# Patient Record
Sex: MALE | Race: BLACK OR AFRICAN AMERICAN | URBAN - METROPOLITAN AREA
[De-identification: names, ages, dates, MRNs, and addresses within clinical notes are randomized per-mention and may not be internally consistent; named-entity substitution may affect disease eponyms.]

---

## 2022-03-05 ENCOUNTER — HOSPITAL ENCOUNTER (EMERGENCY)
Age: 22
Discharge: PSYCHIATRIC OR SUBSTANCE USE FACILITY | End: 2022-03-06
Attending: EMERGENCY MEDICINE
Payer: COMMERCIAL

## 2022-03-05 DIAGNOSIS — R45.851 SUICIDAL IDEATION: Primary | ICD-10-CM

## 2022-03-05 LAB
ACETAMINOPHEN LEVEL: <5 MCG/ML (ref 10–30)
ALBUMIN SERPL-MCNC: 4.4 G/DL (ref 3.5–5.2)
ALP BLD-CCNC: 95 U/L (ref 40–129)
ALT SERPL-CCNC: 18 U/L (ref 0–40)
ANION GAP SERPL CALCULATED.3IONS-SCNC: 10 MMOL/L (ref 7–16)
AST SERPL-CCNC: 26 U/L (ref 0–39)
BASOPHILS ABSOLUTE: 0.03 E9/L (ref 0–0.2)
BASOPHILS RELATIVE PERCENT: 0.7 % (ref 0–2)
BILIRUB SERPL-MCNC: <0.2 MG/DL (ref 0–1.2)
BUN BLDV-MCNC: 13 MG/DL (ref 6–20)
CALCIUM SERPL-MCNC: 9.1 MG/DL (ref 8.6–10.2)
CHLORIDE BLD-SCNC: 102 MMOL/L (ref 98–107)
CO2: 27 MMOL/L (ref 22–29)
CREAT SERPL-MCNC: 1.1 MG/DL (ref 0.7–1.2)
EOSINOPHILS ABSOLUTE: 0.38 E9/L (ref 0.05–0.5)
EOSINOPHILS RELATIVE PERCENT: 9.1 % (ref 0–6)
ETHANOL: <10 MG/DL (ref 0–0.08)
GFR AFRICAN AMERICAN: >60
GFR NON-AFRICAN AMERICAN: >60 ML/MIN/1.73
GLUCOSE BLD-MCNC: 94 MG/DL (ref 74–99)
HCT VFR BLD CALC: 40.9 % (ref 37–54)
HEMOGLOBIN: 12.4 G/DL (ref 12.5–16.5)
IMMATURE GRANULOCYTES #: 0.01 E9/L
IMMATURE GRANULOCYTES %: 0.2 % (ref 0–5)
INFLUENZA A: NOT DETECTED
INFLUENZA B: NOT DETECTED
LYMPHOCYTES ABSOLUTE: 1.46 E9/L (ref 1.5–4)
LYMPHOCYTES RELATIVE PERCENT: 35.1 % (ref 20–42)
MCH RBC QN AUTO: 25.1 PG (ref 26–35)
MCHC RBC AUTO-ENTMCNC: 30.3 % (ref 32–34.5)
MCV RBC AUTO: 82.8 FL (ref 80–99.9)
MONOCYTES ABSOLUTE: 0.37 E9/L (ref 0.1–0.95)
MONOCYTES RELATIVE PERCENT: 8.9 % (ref 2–12)
NEUTROPHILS ABSOLUTE: 1.91 E9/L (ref 1.8–7.3)
NEUTROPHILS RELATIVE PERCENT: 46 % (ref 43–80)
PDW BLD-RTO: 13.2 FL (ref 11.5–15)
PLATELET # BLD: 122 E9/L (ref 130–450)
PMV BLD AUTO: ABNORMAL FL (ref 7–12)
POTASSIUM REFLEX MAGNESIUM: 4.3 MMOL/L (ref 3.5–5)
RBC # BLD: 4.94 E12/L (ref 3.8–5.8)
SALICYLATE, SERUM: <0.3 MG/DL (ref 0–30)
SARS-COV-2 RNA, RT PCR: NOT DETECTED
SODIUM BLD-SCNC: 139 MMOL/L (ref 132–146)
TOTAL PROTEIN: 7.3 G/DL (ref 6.4–8.3)
TRICYCLIC ANTIDEPRESSANTS SCREEN SERUM: NEGATIVE NG/ML
TROPONIN, HIGH SENSITIVITY: <6 NG/L (ref 0–11)
WBC # BLD: 4.2 E9/L (ref 4.5–11.5)

## 2022-03-05 PROCEDURE — 84484 ASSAY OF TROPONIN QUANT: CPT

## 2022-03-05 PROCEDURE — 36415 COLL VENOUS BLD VENIPUNCTURE: CPT

## 2022-03-05 PROCEDURE — 82077 ASSAY SPEC XCP UR&BREATH IA: CPT

## 2022-03-05 PROCEDURE — 99285 EMERGENCY DEPT VISIT HI MDM: CPT

## 2022-03-05 PROCEDURE — 87636 SARSCOV2 & INF A&B AMP PRB: CPT

## 2022-03-05 PROCEDURE — 85025 COMPLETE CBC W/AUTO DIFF WBC: CPT

## 2022-03-05 PROCEDURE — 80307 DRUG TEST PRSMV CHEM ANLYZR: CPT

## 2022-03-05 PROCEDURE — 93005 ELECTROCARDIOGRAM TRACING: CPT | Performed by: EMERGENCY MEDICINE

## 2022-03-05 PROCEDURE — 80143 DRUG ASSAY ACETAMINOPHEN: CPT

## 2022-03-05 PROCEDURE — 80053 COMPREHEN METABOLIC PANEL: CPT

## 2022-03-05 PROCEDURE — 80179 DRUG ASSAY SALICYLATE: CPT

## 2022-03-06 VITALS
TEMPERATURE: 97.7 F | RESPIRATION RATE: 16 BRPM | OXYGEN SATURATION: 100 % | SYSTOLIC BLOOD PRESSURE: 131 MMHG | DIASTOLIC BLOOD PRESSURE: 70 MMHG | HEART RATE: 75 BPM

## 2022-03-06 LAB
AMPHETAMINE SCREEN, URINE: NOT DETECTED
BARBITURATE SCREEN URINE: NOT DETECTED
BENZODIAZEPINE SCREEN, URINE: NOT DETECTED
CANNABINOID SCREEN URINE: NOT DETECTED
COCAINE METABOLITE SCREEN URINE: NOT DETECTED
EKG ATRIAL RATE: 68 BPM
EKG P AXIS: 51 DEGREES
EKG P-R INTERVAL: 158 MS
EKG Q-T INTERVAL: 370 MS
EKG QRS DURATION: 92 MS
EKG QTC CALCULATION (BAZETT): 393 MS
EKG R AXIS: 69 DEGREES
EKG T AXIS: 44 DEGREES
EKG VENTRICULAR RATE: 68 BPM
FENTANYL SCREEN, URINE: NOT DETECTED
Lab: NORMAL
METHADONE SCREEN, URINE: NOT DETECTED
OPIATE SCREEN URINE: NOT DETECTED
OXYCODONE URINE: NOT DETECTED
PHENCYCLIDINE SCREEN URINE: NOT DETECTED

## 2022-03-06 PROCEDURE — 93010 ELECTROCARDIOGRAM REPORT: CPT | Performed by: INTERNAL MEDICINE

## 2022-03-06 NOTE — ED NOTES
Emergency Department [facility] AN AFFILIATE OF [facility] Biopsychosocial Assessment Note    Chief Complaint: The pt was pink slipped by police after the pt threatened to get his mom's gun and shoot himself. MSE:  The pt presents calm and cooperative with a flat affect, depressed mood, circumstantial speech, and poor eye contact. He is oriented times 4 and is a good historian. He denied a hx of AVH. Clinical Summary/History: The pt was pink slipped by police after the pt told his mother that he was going to kill himself. The pink slip stated that after an argument with his mother he left the house and told his mom that he was going to get her gun that she keeps in her car. He reportedly then went to the car but the car was locked so he went to the residence to find another way to harm himself. Police stated that the incident is on the mom's ring doorbell camera. The pt stated that he just returned from [facility] a couple days ago where he goes to [employer] school to play basketball. He stated that his season just ended and now he is looking at colleges for basketball and to major in sports medicine. He stated that he does not get along with his mom and he was just mad. He reported that he was in counseling his juan diego year but he cannot remember where. He stated that he went to counseling because he was not getting along with his mom but \"the counseling did not work out\". The pt denied past or present SI, HI, AVH, AOD, and inpt psych admits. He stated that he only stated that he was suicidal because he was fighting with his mom. Call to the pt's mother- Krys Whitfieldolic- [phone]- Mom stated that the pt has an IEP for a learning disability. She stated that he only has anger issues with her and her mom. She stated that he will have a meltdown and throw things. She stated that he has been threatening to kill himself at school.   He stated that his roommate had a gun and when he was in school he had stated that he had the gun alone one time and that he should have shot himself. Mom stated that she could tell he was depressed because she could tell he lost weight and was not eating and not leaving his housing. Mom stated that when he is in deep thought he will pace and mumble to himself. She stated that he feels that people are talking about him but mom thinks that might be true b/c he does odd things like stay in his dorm for 3 days. Mom stated that she let him take his car to [place] and he would sit in his car all day. His mood varies from anger to depression to being matter of fact. He told mom he thought about [place] off the interstate last [place] but did not tell mom until after the fact. Mom stated that he was on ADHD meds for a month in 5th grade. Pt will be referred for inpt admission once medically cleared. Gender  [x] Male [] Female [] Transgender  [] Other    Sexual Orientation    [x] Heterosexual [] Homosexual [] Bisexual [] Other    Suicidal Behavioral: [facility]- unable to Access- pt guarded and denied  [x] Reports: Threatened to shoot himself     [] Past [x] Present   [] Denies    Homicidal/ Violent Behavior  [] Reports:   [] Past [] Present   [x] Denies     Violence Risk Screenin. Have you ever engaged in a physical fight? []  Yes [x]  No  2. Have you ever had an order of protection taken out against you? []  Yes [x]  No  3. Have you ever been arrested due to violence? []  Yes [x]  No  4. Have you ever been cruel to animals? []  Yes [x]  No    If any of the above questions are affirmative, please complete these questions:  1. Have you ever thought about hurting someone? [x]  No  []  Yes (Ask the questions listed below)   When?  Did you follow through with the thoughts? []  No  []  Yes - What happened? 2.  Have you ever threatened anyone? [x]  No  []  Yes (Ask the questions listed below)   When and what happened?  Have you ever threatened someone with a gun, knife or other weapon? []  No  []  Yes - When and what happened? 3. Have you ever physically hurt someone? [x]  No  []  Yes (Ask the questions listed below)   When and what happened?  How many times have you physically hurt someone in the past?    Hallucinations/Delusions   [] Reports:   [x] Denies     Substance Use/Alcohol Use/Addiction: SBIRT Screen Complete.    [] Reports:   [x] Denies     Trauma History  [] Reports:  [x] Denies     Collateral Information: Pink slip      Level of Care/Disposition Plan  [] Home:   [] Outpatient Provider:   [] Crisis Unit:   [x] Inpatient Psychiatric Unit:  [] Other:        Chelle Morales, [facility]  03/05/22 2122

## 2022-03-06 NOTE — ED NOTES
Jo LEACH, from the [address] [place] [address] called to inform that Dr Angela Artis has accepted this patient to [facility].    Patient will be assigned room on arrival    N2N [phone]    Transportation will be arranged after pink slip fax is received.      MARJORIE Gordon, MISTI  03/06/22 0049      PA ETA 0500       MARJORIE Gordon, MISTI  03/06/22 0110

## 2022-03-06 NOTE — ED PROVIDER NOTES
HPI:  3/5/22,   Time: 8:35 PM THOM Bhardwaj is a 25 y.o. male presenting to the ED for psychiatric evaluation, beginning 1 day ago. The complaint has been persistent, moderate in severity, and worsened by nothing. The patient was pink slipped by police and brought in for psychiatric evaluation. The patient got in an argument with his mother and he told her that he was going to kill himself. He knew that his mother had a gun in her car and he took her car and attempt to take the gun. Patient was found by apartment police and patient did not harm himself. Patient was brought into our facility for further evaluation. Patient refuses to talk to me about the situation on examination. He states that this was just a misunderstanding with his mother. He denies any SI HI or hallucinations to myself. No recent illness. No chest pain shortness of breath. Review of Systems:   Pertinent positives and negatives are stated within HPI, all other systems reviewed and are negative.          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 Past Surgical History:  has no past surgical history on file. Social History:      Family History: family history is not on file. The patients home medications have been reviewed. Allergies: Patient has no known allergies.         ---------------------------------------------------PHYSICAL EXAM--------------------------------------    Constitutional/General: Alert and oriented x3,NAD  Head: Normocephalic and atraumatic  Eyes: PERRL, EOMI, conjunctive normal, sclera non icteric  Mouth: Oropharynx clear, handling secretions, no trismus, no asymmetry of the posterior oropharynx or uvular edema  Neck: Supple, full ROM, non tender to palpation in the midline, no stridor, no crepitus, no meningeal signs  Respiratory: Lungs clear to auscultation bilaterally, no wheezes, rales, or rhonchi. Not in respiratory distress  Cardiovascular:  Regular rate. Regular rhythm. No murmurs, gallops, or rubs. 2+ distal pulses  GI:  Abdomen Soft, Non tender, Non distended. +BS. No organomegaly, no palpable masses,  No rebound, guarding, or rigidity. Musculoskeletal: Moves all extremities x 4. Warm and well perfused, no clubbing, cyanosis, or edema. Capillary refill <3 seconds  Integument: skin warm and dry. No rashes. Neurologic: GCS 15, no focal deficits,   Psychiatric: Depressed affect, poor eye contact    -------------------------------------------------- RESULTS -------------------------------------------------  I have personally reviewed all laboratory and imaging results for this patient. Results are listed below.      LABS:  Results for orders placed or performed during the hospital encounter of 03/05/22   COVID-19 & Influenza Combo    Specimen: Nasopharyngeal Swab   Result Value Ref Range    SARS-CoV-2 RNA, RT PCR NOT DETECTED NOT DETECTED    INFLUENZA A NOT DETECTED NOT DETECTED    INFLUENZA B NOT DETECTED NOT DETECTED   CBC with Auto Differential   Result Value Ref Range    WBC 4.2 (L) 4.5 - 11.5 E9/L    RBC 4.94 3.80 - 5.80 E12/L    Hemoglobin 12.4 (L) 12.5 - 16.5 g/dL    Hematocrit 40.9 37.0 - 54.0 %    MCV 82.8 80.0 - 99.9 fL    MCH 25.1 (L) 26.0 - 35.0 pg    MCHC 30.3 (L) 32.0 - 34.5 %    RDW 13.2 11.5 - 15.0 fL    Platelets 243 (L) 492 - 450 E9/L    MPV NOT CALC 7.0 - 12.0 fL    Neutrophils % 46.0 43.0 - 80.0 %    Immature Granulocytes % 0.2 0.0 - 5.0 %    Lymphocytes % 35.1 20.0 - 42.0 %    Monocytes % 8.9 2.0 - 12.0 %    Eosinophils % 9.1 (H) 0.0 - 6.0 %    Basophils % 0.7 0.0 - 2.0 %    Neutrophils Absolute 1.91 1.80 - 7.30 E9/L    Immature Granulocytes # 0.01 E9/L    Lymphocytes Absolute 1.46 (L) 1.50 - 4.00 E9/L    Monocytes Absolute 0.37 0.10 - 0.95 E9/L    Eosinophils Absolute 0. 38 0.05 - 0.50 E9/L    Basophils Absolute 0.03 0.00 - 0.20 E9/L   Comprehensive Metabolic Panel w/ Reflex to MG   Result Value Ref Range    Sodium 139 132 - 146 mmol/L    Potassium reflex Magnesium 4.3 3.5 - 5.0 mmol/L    Chloride 102 98 - 107 mmol/L    CO2 27 22 - 29 mmol/L    Anion Gap 10 7 - 16 mmol/L    Glucose 94 74 - 99 mg/dL    BUN 13 6 - 20 mg/dL    CREATININE 1.1 0.7 - 1.2 mg/dL    GFR Non-African American >60 >=60 mL/min/1.73    GFR African American >60     Calcium 9.1 8.6 - 10.2 mg/dL    Total Protein 7.3 6.4 - 8.3 g/dL    Albumin 4.4 3.5 - 5.2 g/dL    Total Bilirubin <0.2 0.0 - 1.2 mg/dL    Alkaline Phosphatase 95 40 - 129 U/L    ALT 18 0 - 40 U/L    AST 26 0 - 39 U/L   URINE DRUG SCREEN   Result Value Ref Range    Drug Screen Comment: see below    Serum Drug Screen   Result Value Ref Range    Ethanol Lvl <10 mg/dL    Acetaminophen Level <5.0 (L) 10.0 - 74.0 mcg/mL    Salicylate, Serum <0.0 0.0 - 30.0 mg/dL    TCA Scrn NEGATIVE Cutoff:300 ng/mL   Troponin   Result Value Ref Range    Troponin, High Sensitivity <6 0 - 11 ng/L   EKG 12 Lead   Result Value Ref Range    Ventricular Rate 68 BPM    Atrial Rate 68 BPM    P-R Interval 158 ms    QRS Duration 92 ms    Q-T Interval 370 ms    QTc Calculation (Bazett) 393 ms    P Axis 51 degrees    R Axis 69 degrees    T Axis 44 degrees       RADIOLOGY:  Interpreted by Radiologist.  No orders to display       EKG: This EKG is signed and interpreted by the EP. EKG shows normal sinus rhythm at 66 bpm.  Normal axis. Normal QRS. Patient does have diffuse slight ST elevations likely related to benign early repole. No reciprocal changes. No STEMI.    ------------------------- NURSING NOTES AND VITALS REVIEWED ---------------------------   The nursing notes within the ED encounter and vital signs as below have been reviewed by myself.   BP (!) 145/72   Pulse 74   Temp 97.9 °F (36.6 °C) (Oral)   Resp 18   SpO2 100%   Oxygen Saturation Interpretation: Normal    The patients available past medical records and past encounters were reviewed. ------------------------------ ED COURSE/MEDICAL DECISION MAKING----------------------  Medications - No data to display      ED COURSE:        Medical Decision Making: This is a 82-year-old male presented to the ED for psychiatric evaluation. Patient underwent laboratory work-up which showed a normal CBC except for white count of 4.2 and a hemoglobin of 12.4. Chemistry unremarkable. Serum drug screen negative. Covid screening negative. Patient's EKG did show diffuse ST elevations likely related to benign early repole as the patient is young healthy with no acute chest pain or shortness of breath or anginal equivalents at this time. Troponin was negative. Patient is pink slipped. Patient is medically cleared.  consulted. Disposition pending  evaluation. I, Dr. Yvette Mcburney, am the primary provider for this encounter    This patient's ED course included: a personal history and physicial examination, re-evaluation prior to disposition, multiple bedside re-evaluations, IV medications, cardiac monitoring, continuous pulse oximetry and complex medical decision making and emergency management    This patient has remained hemodynamically stable during their ED course. Re-Evaluations:             Re-evaluation. Patients symptoms show no change      Counseling: The emergency provider has spoken with the patient and discussed todays results, in addition to providing specific details for the plan of care and counseling regarding the diagnosis and prognosis. Questions are answered at this time and they are agreeable with the plan.       --------------------------------- IMPRESSION AND DISPOSITION ---------------------------------    IMPRESSION  1.  Suicidal ideation        DISPOSITION  Disposition: Per   Patient condition is stable    NOTE: This report was transcribed using voice recognition software.  Every effort was made to ensure accuracy; however, inadvertent computerized transcription errors may be present        Shanell Flores DO  03/05/22 5000

## 2022-03-06 NOTE — ED NOTES
Report called to [facility].       Leticia Quarles RN  03/06/22 9534

## 2022-03-06 NOTE — ED NOTES
Jo LEACH, from the [employer] called to inform that [facility] is requesting that we fax pink slip and EKG to them at [address], [place]  03/06/22 6785

## 2022-03-06 NOTE — ED NOTES
[facility] arrived, report given, Forms given. Patient changing into paper gown. All belongings sent with [facility].       Emily Magaña RN  03/06/22 5894

## 2024-07-30 ENCOUNTER — HOSPITAL ENCOUNTER (EMERGENCY)
Age: 24
Discharge: HOME | End: 2024-07-30
Attending: EMERGENCY MEDICINE
Payer: COMMERCIAL

## 2024-07-30 ENCOUNTER — APPOINTMENT (OUTPATIENT)
Dept: GENERAL RADIOLOGY | Age: 24
End: 2024-07-30
Payer: COMMERCIAL

## 2024-07-30 VITALS
RESPIRATION RATE: 16 BRPM | HEART RATE: 70 BPM | WEIGHT: 205 LBS | OXYGEN SATURATION: 99 % | HEIGHT: 77 IN | SYSTOLIC BLOOD PRESSURE: 135 MMHG | TEMPERATURE: 98 F | BODY MASS INDEX: 24.21 KG/M2 | DIASTOLIC BLOOD PRESSURE: 77 MMHG

## 2024-07-30 DIAGNOSIS — S93.602A FOOT SPRAIN, LEFT, INITIAL ENCOUNTER: Primary | ICD-10-CM

## 2024-07-30 PROCEDURE — 6370000000 HC RX 637 (ALT 250 FOR IP): Performed by: EMERGENCY MEDICINE

## 2024-07-30 PROCEDURE — 99283 EMERGENCY DEPT VISIT LOW MDM: CPT

## 2024-07-30 PROCEDURE — 73630 X-RAY EXAM OF FOOT: CPT

## 2024-07-30 RX ORDER — NAPROXEN 500 MG/1
500 TABLET ORAL ONCE
Status: COMPLETED | OUTPATIENT
Start: 2024-07-30 | End: 2024-07-30

## 2024-07-30 RX ORDER — NAPROXEN 250 MG/1
250 TABLET ORAL 2 TIMES DAILY
Qty: 14 TABLET | Refills: 0 | Status: SHIPPED | OUTPATIENT
Start: 2024-07-30 | End: 2024-08-06

## 2024-07-30 RX ADMIN — NAPROXEN 500 MG: 500 TABLET ORAL at 13:27

## 2024-07-30 ASSESSMENT — PAIN SCALES - GENERAL
PAINLEVEL_OUTOF10: 10
PAINLEVEL_OUTOF10: 10

## 2024-07-30 ASSESSMENT — PAIN DESCRIPTION - LOCATION
LOCATION: FOOT
LOCATION: FOOT

## 2024-07-30 ASSESSMENT — PAIN DESCRIPTION - ONSET: ONSET: ON-GOING

## 2024-07-30 ASSESSMENT — PAIN DESCRIPTION - ORIENTATION
ORIENTATION: LEFT
ORIENTATION: LEFT

## 2024-07-30 ASSESSMENT — PAIN - FUNCTIONAL ASSESSMENT
PAIN_FUNCTIONAL_ASSESSMENT: 0-10
PAIN_FUNCTIONAL_ASSESSMENT: PREVENTS OR INTERFERES SOME ACTIVE ACTIVITIES AND ADLS

## 2024-07-30 ASSESSMENT — PAIN DESCRIPTION - PAIN TYPE: TYPE: ACUTE PAIN

## 2024-07-30 ASSESSMENT — PAIN DESCRIPTION - FREQUENCY: FREQUENCY: CONTINUOUS

## 2024-07-30 ASSESSMENT — PAIN DESCRIPTION - DESCRIPTORS
DESCRIPTORS: DISCOMFORT
DESCRIPTORS: SHARP

## 2024-07-30 NOTE — ED PROVIDER NOTES
Interpretation: Normal      ---------------------------------------------------PHYSICAL EXAM--------------------------------------      Constitutional/General: Alert and oriented x3, well appearing, non toxic in NAD  Head: NC/AT  Eyes: , EOMI  Mouth: handling secretions  Neck: Supple  Pulmonary: Not in respiratory distress  Cardiovascular:  Regular rate   Extremities: Moves all extremities x 4.  Mild tenderness to palpation over the left lateral foot over the left fifth metatarsal, there is no erythema, warm to touch or cellulitic changes.  There is 2+ dorsalis pedis and posterior tibial pulse present.  Brisk capillary refill of all 5 digits.   Skin: warm and dry   Neurologic: GCS 15,  Psych: Normal Affect      ------------------------------ ED COURSE/MEDICAL DECISION MAKING----------------------  Medications   naproxen (NAPROSYN) tablet 500 mg (has no administration in time range)         Medical Decision Making:    Patient presents emergency department the chief complaint of pain to palpation over the left lateral foot.  Patient sitting in the bed no acute distress, patient mild tenderness to palpation over the left lateral foot, no overlying erythema, warm to touch or cellulitic changes.  Differential diagnosis includes but not to fracture, strain, dislocation, contusion, sprain.  The patient did have imaging reviewed by me demonstrate no acute fracture.  Patient clinically has a sprain.  Patient will follow-up outpatient with podiatry.  He will be given anti-inflammatory.  He was placed in Aircast by nursing staff    Counseling:   The emergency provider has spoken with the patient and discussed today’s results, in addition to providing specific details for the plan of care and counseling regarding the diagnosis and prognosis.  Questions are answered at this time and they are agreeable with the plan.      --------------------------------- IMPRESSION AND DISPOSITION

## 2024-09-05 ENCOUNTER — OFFICE VISIT (OUTPATIENT)
Dept: ORTHOPEDIC SURGERY | Facility: CLINIC | Age: 24
End: 2024-09-05
Payer: COMMERCIAL

## 2024-09-05 VITALS — BODY MASS INDEX: 24.3 KG/M2 | HEIGHT: 78 IN | WEIGHT: 210 LBS

## 2024-09-05 DIAGNOSIS — S99.912S LEFT ANKLE INJURY, SEQUELA: ICD-10-CM

## 2024-09-05 DIAGNOSIS — S93.402A MODERATE LEFT ANKLE SPRAIN, INITIAL ENCOUNTER: Primary | ICD-10-CM

## 2024-09-05 PROCEDURE — 1036F TOBACCO NON-USER: CPT

## 2024-09-05 PROCEDURE — 99204 OFFICE O/P NEW MOD 45 MIN: CPT

## 2024-09-05 PROCEDURE — 3008F BODY MASS INDEX DOCD: CPT

## 2024-09-05 ASSESSMENT — PAIN - FUNCTIONAL ASSESSMENT: PAIN_FUNCTIONAL_ASSESSMENT: 0-10

## 2024-09-05 ASSESSMENT — PAIN SCALES - GENERAL: PAINLEVEL_OUTOF10: 0 - NO PAIN

## 2024-09-05 NOTE — PROGRESS NOTES
LORENA  Darius Ellis is a 24 y.o. male  in office today for   Chief Complaint   Patient presents with    Left Ankle - Pain, Edema     6 weeks ago he rolled his ankle he states he was in a boot for couple weeks that helped.    .  He saw another provider, but has since returned to college and this is closer and wanted to follow up and establish closer to school. he states pain has improved and not having any pain at this point with walking.  He plays basketball at [employer] and notices pain when cutting during basketball conditioning and feels like it isn't as stable.  He works with  at school, but no formal rehab.  Not wearing brace or boot.      Medication  No current outpatient medications on file prior to visit.     No current facility-administered medications on file prior to visit.       Physical Exam  Constitutional: well developed, well nourished male in no acute distress  Psychiatric: normal mood, appropriate affect  Eyes: sclera anicteric  HENT: normocephalic/atraumatic  CV: regular rate and rhythm   Respiratory: non labored breathing  Integumentary: no rash  Neurological: moves all extremities    Left Ankle Exam     Tenderness   Left ankle tenderness location: near base of fifth metatarsal.   Swelling: none    Range of Motion   The patient has normal left ankle ROM.     Muscle Strength   Dorsiflexion:  5/5   Plantar flexion:  5/5   Peroneal muscle:  4/5    Tests   Varus tilt: negative    Other   Erythema: absent  Scars: absent  Sensation: normal              Imaging/Lab:  X-rays and MRI were taken 8/1/24 at outside facility and disc and report were provided by patient which were reviewed by myself and read by radiology and show a partial tear of the peroneus brevis tendon at its attachment to the base of the fifth metatarsal.  Flexor, extensor, peroneus longus and Achilles intact.  No ligament injury.  No acute fracture or dislocation.  Small ankle effusion.  Mild bone marrow edema on both side  of the fourth TMT joint.  Soft tissue swelling over lateral ankle and dorsal foot.      Assessment  Assessment: Left ankle sprain with partial tear of the peroneal brevis    Plan  Plan:  History, physical exam, and imaging were reviewed with patient. Discussed continuing to work with  at [facility] and to start working with PT to help re-strengthen the ankle.  PT orders given. Should avoid activity with increased pain, no cutting or pivoting if that causes pain.  Can return to boot if needed for periods of increased pain.  Discussed wearing soft brace during basketball activity.  Continue with RICE and antiinflammatories as needed for pain.  Follow Up: 4-6 weeks    All questions were answered for the patient prior to end of exam and patient addressed their understanding.    Bindu Bajwa PA-C  09/05/24

## 2024-09-05 NOTE — LETTER
September 5, 2024     Patient: Darius Ellis   YOB: 2000   Date of Visit: 9/5/2024       To Whom it May Concern:    Darius Ellis was seen in my clinic on 9/5/2024. He may return to gym class or sports on 9/6/24 but should not do lateral moves for 4 weeks  .    If you have any questions or concerns, please don't hesitate to call.         Sincerely,          Bindu Bajwa PA-C        CC: No Recipients

## 2024-09-12 ENCOUNTER — APPOINTMENT (OUTPATIENT)
Dept: PHYSICAL THERAPY | Facility: CLINIC | Age: 24
End: 2024-09-12
Payer: COMMERCIAL

## 2024-09-12 NOTE — PROGRESS NOTES
Physical Therapy Evaluation    Patient Name: Darius Ellis  MRN: [id number]  Evaluation Date: 9/12/2024                Problem List Items Addressed This Visit    None        Subjective   General:  Patient is a 25 yo male with diagnosis of left ankle sprain.  Patient plays basketball at [employer].  Patient notices pain/instability with cutting durign basketball conditioning.  Patient working with [employer]; however, no formal rehab thus far.       Darius Ellis is a 24 y.o. male  in office today for        Chief Complaint   Patient presents with    Left Ankle - Pain, Edema       6 weeks ago he rolled his ankle he states he was in a boot for couple weeks that helped.    .  He saw another provider, but has since returned to college and this is closer and wanted to follow up and establish closer to school. he states pain has improved and not having any pain at this point with walking.  He plays basketball at [employer] and notices pain when cutting during basketball conditioning and feels like it isn't as stable.  He works with  at school, but no formal rehab.  Not wearing brace or boot.     Patient reported hx of condition: ***      Surgery:   ***    Precautions:       Relevant PMH:  ***    Red flags: ***    Pain:     Home Living:       Home type: {dkhousetype:85103}  Stairs: {yes,no:76591}  Lives with: {dkliveswith:20582}  Occupation: student athlete-basketball [employer]  Work tasks: ***  Hobbies/activities: ***    Prior Function Per Pt/Caregiver Report:  Active male    Imaging:  ***      Objective   Posture:  arches     Range of Motion:    Ankle AROM L R   Dorsiflexion *** deg *** deg   Plantarflexion *** deg *** deg   Eversion *** deg *** deg   Inversion *** deg *** deg         Strength:    Ankle MMT L R   Dorsiflexion ***/5 ***/5   Plantarflexion ***/5 ***/5   Eversion ***/5 ***/5   Inversion ***/5 ***/5     Flexibility:     Palpation:     Special Tests:     Gait:     Balance:  SLS left  SLS  right       Outcome Measures:  ANGELO    Assessment  Pt is a 24 y.o. male who presents with impairments of left ankle swelling pain/wekness. These impairments have led to functional limitations including difficulty with athletic participation. Pt would benefit from skilled physical therapy intervention to improve above impairments and facilitate return to function.    Plan  Up to 9 additional visits  Therapeutic exercise  Manual (STM/DN)  Ultrasound  Therapeutic activities     Plan for next visit: ***    OP EDUCATION:       Today's Treatment:  {Evaluation treatment options:27759}  HEP to be completed daily, exercises include:  ***    Goals:  STG (3 visits)   Patient to be independent with HNP    LTG (10 visits)   LEFS > 10% impaired   Patient to report no pain with all basketball acitivites   Patient to hop right foot 90% of eft

## 2024-09-17 ENCOUNTER — OFFICE VISIT (OUTPATIENT)
Dept: URGENT CARE | Age: 24
End: 2024-09-17
Payer: COMMERCIAL

## 2024-09-17 VITALS
SYSTOLIC BLOOD PRESSURE: 121 MMHG | WEIGHT: 210 LBS | OXYGEN SATURATION: 97 % | RESPIRATION RATE: 18 BRPM | DIASTOLIC BLOOD PRESSURE: 73 MMHG | HEART RATE: 79 BPM | TEMPERATURE: 98.2 F | BODY MASS INDEX: 24.27 KG/M2

## 2024-09-17 DIAGNOSIS — J02.9 SORE THROAT: ICD-10-CM

## 2024-09-17 DIAGNOSIS — J01.90 ACUTE NON-RECURRENT SINUSITIS, UNSPECIFIED LOCATION: Primary | ICD-10-CM

## 2024-09-17 LAB
POC RAPID STREP: NEGATIVE
POC SARS-COV-2 AG BINAX: NORMAL

## 2024-09-17 RX ORDER — AZITHROMYCIN 250 MG/1
TABLET, FILM COATED ORAL
Qty: 6 TABLET | Refills: 0 | Status: SHIPPED | OUTPATIENT
Start: 2024-09-17

## 2024-09-17 ASSESSMENT — ENCOUNTER SYMPTOMS
VOMITING: 0
SWOLLEN GLANDS: 1
TROUBLE SWALLOWING: 1
RHINORRHEA: 1
COUGH: 0
SHORTNESS OF BREATH: 0
HEADACHES: 1
FATIGUE: 0
NAUSEA: 0
DIARRHEA: 0
SORE THROAT: 1
SINUS PRESSURE: 1
ABDOMINAL PAIN: 0
CHILLS: 0
FEVER: 0
SINUS PAIN: 1

## 2024-09-17 NOTE — PATIENT INSTRUCTIONS
Tylenol/Ibuprofen  OTC nasal spray as needed     Warm salt water gargles  OTC throat sprays/lozenges

## 2024-09-17 NOTE — PROGRESS NOTES
Subjective   Patient ID: Darius Ellis is a 24 y.o. male. They present today with a chief complaint of Sore Throat, Headache, and Sinusitis.    History of Present Illness    History provided by:  Patient  History limited by: na.   used: No    Sore Throat   This is a new problem. The current episode started yesterday. The problem has been unchanged. There has been no fever. The pain is moderate. Associated symptoms include congestion, headaches, swollen glands and trouble swallowing. Pertinent negatives include no abdominal pain, coughing, diarrhea, ear discharge, ear pain, shortness of breath or vomiting. He has had no exposure to strep or mono. He has tried acetaminophen for the symptoms. The treatment provided mild relief.   Headache  Associated symptoms: congestion, drainage, sinus pressure, sore throat and swollen glands    Associated symptoms: no abdominal pain, no cough, no diarrhea, no ear pain, no fatigue, no fever, no nausea and no vomiting    Sinusitis  Associated symptoms: congestion, headaches, rhinorrhea, sore throat and swollen glands    Associated symptoms: no chest pain, no chills, no cough, no ear pain, no fatigue, no fever, no nausea, no shortness of breath and no vomiting        Past Medical History  Allergies as of 09/17/2024    (No Known Allergies)       (Not in a hospital admission)       No past medical history on file.    No past surgical history on file.     reports that he has never smoked. He has never used smokeless tobacco.    Review of Systems  Review of Systems   Constitutional:  Negative for chills, fatigue and fever.   HENT:  Positive for congestion, postnasal drip, rhinorrhea, sinus pressure, sinus pain, sore throat and trouble swallowing. Negative for ear discharge and ear pain.    Respiratory:  Negative for cough and shortness of breath.    Cardiovascular:  Negative for chest pain.   Gastrointestinal:  Negative for abdominal pain, diarrhea, nausea and vomiting.    Neurological:  Positive for headaches.                                  Objective    Vitals:    09/17/24 1930   BP: 121/73   BP Location: Left arm   Pulse: 79   Resp: 18   Temp: 36.8 °C (98.2 °F)   SpO2: 97%   Weight: 95.3 kg (210 lb)     No LMP for male patient.    Physical Exam  Vitals and nursing note reviewed.   Constitutional:       Appearance: Normal appearance.   HENT:      Head: Normocephalic.      Right Ear: Hearing, tympanic membrane, ear canal and external ear normal.      Left Ear: Hearing, tympanic membrane, ear canal and external ear normal.      Nose: Nasal tenderness, mucosal edema, congestion and rhinorrhea present. Rhinorrhea is purulent.      Right Turbinates: Enlarged and swollen.      Left Turbinates: Enlarged and swollen.      Right Sinus: Maxillary sinus tenderness and frontal sinus tenderness present.      Left Sinus: Maxillary sinus tenderness and frontal sinus tenderness present.      Mouth/Throat:      Mouth: Mucous membranes are moist.      Pharynx: Posterior oropharyngeal erythema present.      Tonsils: No tonsillar exudate.   Eyes:      Pupils: Pupils are equal, round, and reactive to light.   Cardiovascular:      Rate and Rhythm: Normal rate and regular rhythm.   Pulmonary:      Effort: Pulmonary effort is normal.      Breath sounds: Normal breath sounds.   Abdominal:      General: Bowel sounds are normal.      Palpations: Abdomen is soft.   Skin:     General: Skin is warm and dry.      Capillary Refill: Capillary refill takes less than 2 seconds.   Neurological:      General: No focal deficit present.      Mental Status: He is alert.   Psychiatric:         Mood and Affect: Mood normal.         Procedures    Point of Care Test & Imaging Results from this visit  Results for orders placed or performed in visit on 09/17/24   POCT Covid-19 Rapid Antigen   Result Value Ref Range    POC SHARIF-COV-2 AG  Presumptive negative test for SARS-CoV-2 (no antigen detected)     Presumptive negative  test for SARS-CoV-2 (no antigen detected)   POCT rapid strep A manually resulted   Result Value Ref Range    POC Rapid Strep Negative Negative      No results found.    Diagnostic study results (if any) were reviewed by Crystal L Severino, APRN-CNP.    Assessment/Plan   Allergies, medications, history, and pertinent labs/EKGs/Imaging reviewed by Crystal L Severino, APRN-CNP.             Orders and Diagnoses  Diagnoses and all orders for this visit:  Acute non-recurrent sinusitis, unspecified location  -     azithromycin (Zithromax) 250 mg tablet; Take 2 tabs (500 mg) by mouth today, than 1 daily for 4 days.  Sore throat  -     POCT Covid-19 Rapid Antigen  -     POCT rapid strep A manually resulted      Medical Admin Record      Follow Up Instructions  No follow-ups on file.    Patient disposition: Home    Electronically signed by Crystal L Severino, APRN-CNP  7:48 PM

## 2024-09-17 NOTE — LETTER
September 17, 2024     Patient: Darius Ellis   YOB: 2000   Date of Visit: 9/17/2024       To Whom It May Concern:    It is my medical opinion that Darius Ellis may return to work on 9/18/24 .    If you have any questions or concerns, please don't hesitate to call.         Sincerely,        Crystal L Severino, APRN-CNP    CC: No Recipients